# Patient Record
Sex: FEMALE | Race: WHITE | NOT HISPANIC OR LATINO
[De-identification: names, ages, dates, MRNs, and addresses within clinical notes are randomized per-mention and may not be internally consistent; named-entity substitution may affect disease eponyms.]

---

## 2019-09-09 ENCOUNTER — APPOINTMENT (OUTPATIENT)
Dept: OBGYN | Facility: CLINIC | Age: 24
End: 2019-09-09
Payer: COMMERCIAL

## 2019-09-09 ENCOUNTER — ASOB RESULT (OUTPATIENT)
Age: 24
End: 2019-09-09

## 2019-09-09 VITALS
WEIGHT: 138 LBS | BODY MASS INDEX: 21.66 KG/M2 | HEIGHT: 67 IN | SYSTOLIC BLOOD PRESSURE: 108 MMHG | DIASTOLIC BLOOD PRESSURE: 62 MMHG

## 2019-09-09 DIAGNOSIS — Z86.19 PERSONAL HISTORY OF OTHER INFECTIOUS AND PARASITIC DISEASES: ICD-10-CM

## 2019-09-09 DIAGNOSIS — Z80.3 FAMILY HISTORY OF MALIGNANT NEOPLASM OF BREAST: ICD-10-CM

## 2019-09-09 DIAGNOSIS — Z86.59 PERSONAL HISTORY OF OTHER MENTAL AND BEHAVIORAL DISORDERS: ICD-10-CM

## 2019-09-09 DIAGNOSIS — Z78.9 OTHER SPECIFIED HEALTH STATUS: ICD-10-CM

## 2019-09-09 DIAGNOSIS — Z83.3 FAMILY HISTORY OF DIABETES MELLITUS: ICD-10-CM

## 2019-09-09 PROBLEM — Z00.00 ENCOUNTER FOR PREVENTIVE HEALTH EXAMINATION: Status: ACTIVE | Noted: 2019-09-09

## 2019-09-09 LAB
HCG UR QL: POSITIVE
QUALITY CONTROL: YES

## 2019-09-09 PROCEDURE — 76830 TRANSVAGINAL US NON-OB: CPT

## 2019-09-09 PROCEDURE — 81025 URINE PREGNANCY TEST: CPT

## 2019-09-09 PROCEDURE — 99202 OFFICE O/P NEW SF 15 MIN: CPT | Mod: 25

## 2019-09-09 PROCEDURE — 36415 COLL VENOUS BLD VENIPUNCTURE: CPT

## 2019-09-10 PROBLEM — Z78.9 SOCIAL ALCOHOL USE: Status: ACTIVE | Noted: 2019-09-09

## 2019-09-10 PROBLEM — Z83.3 FAMILY HISTORY OF DIABETES MELLITUS: Status: ACTIVE | Noted: 2019-09-09

## 2019-09-10 PROBLEM — Z86.19 HISTORY OF CHLAMYDIA INFECTION: Status: RESOLVED | Noted: 2019-09-09 | Resolved: 2019-09-10

## 2019-09-10 PROBLEM — Z78.9 DOES NOT USE ILLICIT DRUGS: Status: ACTIVE | Noted: 2019-09-09

## 2019-09-10 PROBLEM — Z80.3 FAMILY HISTORY OF MALIGNANT NEOPLASM OF BREAST: Status: ACTIVE | Noted: 2019-09-09

## 2019-09-10 PROBLEM — Z86.59 HISTORY OF OBSESSIVE COMPULSIVE DISORDER: Status: RESOLVED | Noted: 2019-09-09 | Resolved: 2019-09-10

## 2019-09-10 LAB
BASOPHILS # BLD AUTO: 0.03 K/UL
BASOPHILS NFR BLD AUTO: 0.5 %
EOSINOPHIL # BLD AUTO: 0.06 K/UL
EOSINOPHIL NFR BLD AUTO: 1 %
HCT VFR BLD CALC: 39.9 %
HGB BLD-MCNC: 12.9 G/DL
IMM GRANULOCYTES NFR BLD AUTO: 0.2 %
LYMPHOCYTES # BLD AUTO: 1.51 K/UL
LYMPHOCYTES NFR BLD AUTO: 26 %
MAN DIFF?: NORMAL
MCHC RBC-ENTMCNC: 31.9 PG
MCHC RBC-ENTMCNC: 32.3 GM/DL
MCV RBC AUTO: 98.5 FL
MONOCYTES # BLD AUTO: 0.42 K/UL
MONOCYTES NFR BLD AUTO: 7.2 %
NEUTROPHILS # BLD AUTO: 3.77 K/UL
NEUTROPHILS NFR BLD AUTO: 65.1 %
PLATELET # BLD AUTO: 234 K/UL
RBC # BLD: 4.05 M/UL
RBC # FLD: 11.9 %
WBC # FLD AUTO: 5.8 K/UL

## 2019-09-10 NOTE — HISTORY OF PRESENT ILLNESS
[___ Month(s) Ago] : [unfilled] month(s) ago [Good] : being in good health [Healthy Diet] : a healthy diet [Regular Exercise] : regular exercise [Last Pap ___] : Last cervical pap smear was [unfilled] [Reproductive Age] : is of reproductive age [Pregnancy History] : pregnancy history: [Total Preg ___] : [unfilled] [Living ___] : [unfilled] [Multiple Male Partners] : has multiple male partners [Definite:  ___ (Date)] : the last menstrual period was [unfilled] [Normal Amount/Duration] : was of a normal amount and duration [Regular Cycle Intervals] : periods have been regular [Frequency: Q ___ days] : menstrual periods occur approximately every [unfilled] days [Prior Menses:  ___ Date] : date of prior menstruation was [unfilled] [Menarche Age: ____] : age at menarche was [unfilled] [Sexually Active] : is sexually active [Male ___] : [unfilled] male [Weight Concerns] : no concerns with her weight [Menstrual Problems] : reports normal menses [Contraception] : does not use contraception [Spotting Between  Menses] : no spotting between menses [Monogamous] : is not monogamous

## 2019-09-10 NOTE — CHIEF COMPLAINT
[Initial Visit] : initial GYN visit [FreeTextEntry1] : Positive home pregnancy test and vaginal bleeding

## 2019-09-10 NOTE — PHYSICAL EXAM
[Alert] : alert [Awake] : awake [Soft] : soft [Oriented x3] : oriented to person, place, and time [No Lesions] : no genitalia lesions [Normal] : uterus [Labia Majora] : labia major [Pink Rugae] : pink rugae [Labia Minora] : labia minora [Scant] : there was scant vaginal bleeding [Normal Position] : in a normal position [Uterine Adnexae] : were not tender and not enlarged [Acute Distress] : no acute distress [Distended] : not distended [Tender] : non tender [Depressed Mood] : not depressed [Labia Majora Erythema] : no erythema of the labia majora [Labia Minora Erythema] : no erythema of the labia minora [Dilated] : the cervix was not dilated [Tenderness] : nontender [Motion Tenderness] : there was no cervical motion tenderness [Enlarged ___ wks] : not enlarged [Adnexa Tenderness] : were not tender [Mass ___ cm] : no uterine mass was palpated [FreeTextEntry4] : 5 cc of dark vaginal blood. No products of conception. [Ovarian Mass (___ Cm)] : there were no adnexal masses

## 2019-09-11 ENCOUNTER — APPOINTMENT (OUTPATIENT)
Dept: OBGYN | Facility: CLINIC | Age: 24
End: 2019-09-11

## 2019-09-12 LAB
ABO + RH PNL BLD: NORMAL
BLD GP AB SCN SERPL QL: NORMAL
C TRACH RRNA SPEC QL NAA+PROBE: NOT DETECTED
HAV IGM SER QL: NONREACTIVE
HBV CORE IGM SER QL: NONREACTIVE
HBV SURFACE AG SER QL: NONREACTIVE
HCG SERPL-MCNC: 122 MIU/ML
HCG SERPL-MCNC: 289 MIU/ML
HCV AB SER QL: NONREACTIVE
HCV S/CO RATIO: 0.12 S/CO
HIV1+2 AB SPEC QL IA.RAPID: NONREACTIVE
N GONORRHOEA RRNA SPEC QL NAA+PROBE: NOT DETECTED
PROGEST SERPL-MCNC: 0.4 NG/ML
PROGEST SERPL-MCNC: 0.6 NG/ML
SOURCE AMPLIFICATION: NORMAL
SOURCE AMPLIFICATION: NORMAL
T PALLIDUM AB SER QL IA: NEGATIVE
T VAGINALIS RRNA SPEC QL NAA+PROBE: NOT DETECTED

## 2019-09-13 ENCOUNTER — APPOINTMENT (OUTPATIENT)
Dept: OBGYN | Facility: CLINIC | Age: 24
End: 2019-09-13
Payer: COMMERCIAL

## 2019-09-13 LAB
HSV 1+2 IGG SER IA-IMP: NEGATIVE
HSV 1+2 IGG SER IA-IMP: POSITIVE
HSV1 IGG SER QL: 37.1 INDEX
HSV2 IGG SER QL: 0.12 INDEX

## 2019-09-13 PROCEDURE — 36415 COLL VENOUS BLD VENIPUNCTURE: CPT

## 2019-09-13 PROCEDURE — 99213 OFFICE O/P EST LOW 20 MIN: CPT

## 2019-09-18 LAB — HCG SERPL-MCNC: 69 MIU/ML

## 2019-10-22 ENCOUNTER — APPOINTMENT (OUTPATIENT)
Dept: OBGYN | Facility: CLINIC | Age: 24
End: 2019-10-22
Payer: COMMERCIAL

## 2019-10-22 VITALS
WEIGHT: 130 LBS | SYSTOLIC BLOOD PRESSURE: 98 MMHG | DIASTOLIC BLOOD PRESSURE: 60 MMHG | BODY MASS INDEX: 20.4 KG/M2 | HEIGHT: 67 IN

## 2019-10-22 PROCEDURE — 99213 OFFICE O/P EST LOW 20 MIN: CPT

## 2019-10-22 PROCEDURE — 36415 COLL VENOUS BLD VENIPUNCTURE: CPT

## 2019-10-22 PROCEDURE — 81025 URINE PREGNANCY TEST: CPT

## 2019-10-22 NOTE — HISTORY OF PRESENT ILLNESS
[Menarche Age: ____] : age at menarche was [unfilled] [Definite:  ___ (Date)] : the last menstrual period was [unfilled] [Sexually Active] : is sexually active [Monogamous] : is monogamous [Contraception] : uses contraception [Oral Contraceptives] : uses oral contraceptives [Male ___] : [unfilled] male

## 2019-10-23 LAB
HCG SERPL-MCNC: 6630 MIU/ML
HCG UR QL: POSITIVE
PROGEST SERPL-MCNC: 18.7 NG/ML
QUALITY CONTROL: YES

## 2019-10-24 ENCOUNTER — APPOINTMENT (OUTPATIENT)
Dept: OBGYN | Facility: CLINIC | Age: 24
End: 2019-10-24
Payer: COMMERCIAL

## 2019-10-24 VITALS
SYSTOLIC BLOOD PRESSURE: 110 MMHG | DIASTOLIC BLOOD PRESSURE: 76 MMHG | HEIGHT: 67 IN | BODY MASS INDEX: 20.4 KG/M2 | WEIGHT: 130 LBS

## 2019-10-24 PROCEDURE — 99213 OFFICE O/P EST LOW 20 MIN: CPT

## 2019-10-24 PROCEDURE — 36415 COLL VENOUS BLD VENIPUNCTURE: CPT

## 2019-10-24 NOTE — HISTORY OF PRESENT ILLNESS
[unknown] : the patient is unsure of the date of her LMP [Menarche Age: ____] : age at menarche was [unfilled]

## 2019-10-27 LAB
HCG SERPL-MCNC: ABNORMAL MIU/ML
PROGEST SERPL-MCNC: 21.1 NG/ML

## 2019-10-27 NOTE — DISCUSSION/SUMMARY
[FreeTextEntry1] : Pt aware pending lab results, further tx will be decided.  All the pt's questions and concerns were addressed.

## 2019-10-27 NOTE — DISCUSSION/SUMMARY
[FreeTextEntry1] : Pt and boyfriend's questions and concerns were addressed.  Call parameters reviewed.  Pt to RTO 1 week TVS/ appointment.

## 2019-10-31 ENCOUNTER — APPOINTMENT (OUTPATIENT)
Dept: OBGYN | Facility: CLINIC | Age: 24
End: 2019-10-31
Payer: COMMERCIAL

## 2019-10-31 ENCOUNTER — ASOB RESULT (OUTPATIENT)
Age: 24
End: 2019-10-31

## 2019-10-31 VITALS
BODY MASS INDEX: 20.4 KG/M2 | SYSTOLIC BLOOD PRESSURE: 120 MMHG | HEIGHT: 67 IN | WEIGHT: 130 LBS | DIASTOLIC BLOOD PRESSURE: 70 MMHG

## 2019-10-31 PROCEDURE — 81003 URINALYSIS AUTO W/O SCOPE: CPT | Mod: QW

## 2019-10-31 PROCEDURE — 99213 OFFICE O/P EST LOW 20 MIN: CPT | Mod: 25

## 2019-10-31 PROCEDURE — 76817 TRANSVAGINAL US OBSTETRIC: CPT

## 2019-10-31 NOTE — DISCUSSION/SUMMARY
[FreeTextEntry1] : Pt to RTO 1st OB/sono.  Call parameters reviewed.  All the pt's questions and concerns were addressed.

## 2019-11-02 LAB
BILIRUB UR QL STRIP: NORMAL
GLUCOSE UR-MCNC: NORMAL
HCG UR QL: 0.2 EU/DL
HGB UR QL STRIP.AUTO: NORMAL
KETONES UR-MCNC: NORMAL
LEUKOCYTE ESTERASE UR QL STRIP: NORMAL
NITRITE UR QL STRIP: NORMAL
PH UR STRIP: 7
PROT UR STRIP-MCNC: NORMAL
SP GR UR STRIP: 1.01

## 2019-11-21 ENCOUNTER — APPOINTMENT (OUTPATIENT)
Dept: OBGYN | Facility: CLINIC | Age: 24
End: 2019-11-21
Payer: COMMERCIAL

## 2019-11-21 ENCOUNTER — ASOB RESULT (OUTPATIENT)
Age: 24
End: 2019-11-21

## 2019-11-21 VITALS
WEIGHT: 136 LBS | SYSTOLIC BLOOD PRESSURE: 110 MMHG | HEIGHT: 67 IN | BODY MASS INDEX: 21.35 KG/M2 | DIASTOLIC BLOOD PRESSURE: 70 MMHG

## 2019-11-21 PROCEDURE — 0500F INITIAL PRENATAL CARE VISIT: CPT

## 2019-11-21 PROCEDURE — 36415 COLL VENOUS BLD VENIPUNCTURE: CPT

## 2019-11-21 PROCEDURE — 76817 TRANSVAGINAL US OBSTETRIC: CPT

## 2019-11-26 LAB
ABO + RH PNL BLD: NORMAL
B19V IGG SER QL IA: 6.3 INDEX
B19V IGG+IGM SER-IMP: NORMAL
B19V IGG+IGM SER-IMP: POSITIVE
B19V IGM FLD-ACNC: 0.1 INDEX
B19V IGM SER-ACNC: NEGATIVE
BASOPHILS # BLD AUTO: 0.03 K/UL
BASOPHILS NFR BLD AUTO: 0.4 %
BILIRUB UR QL STRIP: NORMAL
BLD GP AB SCN SERPL QL: NORMAL
C TRACH RRNA SPEC QL NAA+PROBE: NOT DETECTED
CMV IGG SERPL QL: <0.2 U/ML
CMV IGG SERPL-IMP: NEGATIVE
CMV IGM SERPL QL: <8 AU/ML
CMV IGM SERPL QL: NEGATIVE
EOSINOPHIL # BLD AUTO: 0.01 K/UL
EOSINOPHIL NFR BLD AUTO: 0.1 %
ESTIMATED AVERAGE GLUCOSE: 94 MG/DL
GLUCOSE UR-MCNC: NORMAL
HBA1C MFR BLD HPLC: 4.9 %
HBV SURFACE AG SER QL: NONREACTIVE
HCG UR QL: 0.2 EU/DL
HCT VFR BLD CALC: 39.4 %
HGB A MFR BLD: 97.6 %
HGB A2 MFR BLD: 2.4 %
HGB BLD-MCNC: 12.9 G/DL
HGB FRACT BLD-IMP: NORMAL
HGB UR QL STRIP.AUTO: NORMAL
HIV1+2 AB SPEC QL IA.RAPID: NONREACTIVE
HPV HIGH+LOW RISK DNA PNL CVX: NOT DETECTED
IMM GRANULOCYTES NFR BLD AUTO: 0.4 %
KETONES UR-MCNC: NORMAL
LEUKOCYTE ESTERASE UR QL STRIP: NORMAL
LYMPHOCYTES # BLD AUTO: 1.68 K/UL
LYMPHOCYTES NFR BLD AUTO: 22 %
M TB IFN-G BLD-IMP: NEGATIVE
MAN DIFF?: NORMAL
MCHC RBC-ENTMCNC: 31.2 PG
MCHC RBC-ENTMCNC: 32.7 GM/DL
MCV RBC AUTO: 95.2 FL
MEV IGG FLD QL IA: 7.8 AU/ML
MEV IGG+IGM SER-IMP: NEGATIVE
MEV IGM SER QL: NEGATIVE
MONOCYTES # BLD AUTO: 0.42 K/UL
MONOCYTES NFR BLD AUTO: 5.5 %
N GONORRHOEA RRNA SPEC QL NAA+PROBE: NOT DETECTED
NEUTROPHILS # BLD AUTO: 5.45 K/UL
NEUTROPHILS NFR BLD AUTO: 71.6 %
NITRITE UR QL STRIP: NORMAL
PH UR STRIP: 6.5
PLATELET # BLD AUTO: 222 K/UL
PROT UR STRIP-MCNC: NORMAL
QUANTIFERON TB PLUS MITOGEN MINUS NIL: 8.49 IU/ML
QUANTIFERON TB PLUS NIL: 0.01 IU/ML
QUANTIFERON TB PLUS TB1 MINUS NIL: 0 IU/ML
QUANTIFERON TB PLUS TB2 MINUS NIL: 0 IU/ML
RBC # BLD: 4.14 M/UL
RBC # FLD: 11.8 %
RUBV IGG FLD-ACNC: 1.8 INDEX
RUBV IGG SER-IMP: POSITIVE
SOURCE TP AMPLIFICATION: NORMAL
SP GR UR STRIP: 1.01
T GONDII AB SER-IMP: ABNORMAL
T GONDII AB SER-IMP: NEGATIVE
T GONDII IGG SER QL: 8.2 IU/ML
T GONDII IGM SER QL: <3 AU/ML
T PALLIDUM AB SER QL IA: NEGATIVE
TSH SERPL-ACNC: 2.03 UIU/ML
VZV AB TITR SER: POSITIVE
VZV IGG SER IF-ACNC: 1459 INDEX
WBC # FLD AUTO: 7.62 K/UL

## 2019-12-05 LAB
AR GENE MUT ANL BLD/T: NEGATIVE
CFTR MUT TESTED BLD/T: NEGATIVE
FMR1 GENE MUT ANL BLD/T: NORMAL

## 2019-12-06 LAB — CYTOLOGY CVX/VAG DOC THIN PREP: ABNORMAL

## 2019-12-09 ENCOUNTER — APPOINTMENT (OUTPATIENT)
Dept: OBGYN | Facility: CLINIC | Age: 24
End: 2019-12-09
Payer: COMMERCIAL

## 2019-12-09 ENCOUNTER — NON-APPOINTMENT (OUTPATIENT)
Age: 24
End: 2019-12-09

## 2019-12-09 ENCOUNTER — ASOB RESULT (OUTPATIENT)
Age: 24
End: 2019-12-09

## 2019-12-09 VITALS
DIASTOLIC BLOOD PRESSURE: 60 MMHG | WEIGHT: 142 LBS | SYSTOLIC BLOOD PRESSURE: 100 MMHG | BODY MASS INDEX: 22.29 KG/M2 | HEIGHT: 67 IN

## 2019-12-09 DIAGNOSIS — Z3A.09 9 WEEKS GESTATION OF PREGNANCY: ICD-10-CM

## 2019-12-09 DIAGNOSIS — Z32.01 ENCOUNTER FOR PREGNANCY TEST, RESULT POSITIVE: ICD-10-CM

## 2019-12-09 DIAGNOSIS — Z11.3 ENCOUNTER FOR SCREENING FOR INFECTIONS WITH A PREDOMINANTLY SEXUAL MODE OF TRANSMISSION: ICD-10-CM

## 2019-12-09 DIAGNOSIS — B58.9 TOXOPLASMOSIS, UNSPECIFIED: ICD-10-CM

## 2019-12-09 LAB
BILIRUB UR QL STRIP: NORMAL
COLLECTION METHOD: NORMAL
GLUCOSE UR-MCNC: NORMAL
HCG UR QL: 0.2 EU/DL
HGB UR QL STRIP.AUTO: NORMAL
KETONES UR-MCNC: NORMAL
LEUKOCYTE ESTERASE UR QL STRIP: NORMAL
NITRITE UR QL STRIP: NORMAL
PH UR STRIP: 7
PROT UR STRIP-MCNC: NORMAL
SP GR UR STRIP: 1

## 2019-12-09 PROCEDURE — 76813 OB US NUCHAL MEAS 1 GEST: CPT

## 2019-12-09 PROCEDURE — 0502F SUBSEQUENT PRENATAL CARE: CPT

## 2019-12-09 PROCEDURE — 36415 COLL VENOUS BLD VENIPUNCTURE: CPT

## 2019-12-11 LAB
T GONDII AB SER-IMP: NEGATIVE
T GONDII AB SER-IMP: POSITIVE
T GONDII IGG SER QL: 8.8 IU/ML
T GONDII IGM SER QL: <3 AU/ML

## 2019-12-17 LAB
1ST TRIMESTER DATA: NORMAL
ADDENDUM DOC: NORMAL
AFP PNL SERPL: NORMAL
AFP SERPL-ACNC: NORMAL
CLINICAL BIOCHEMIST REVIEW: NORMAL
FREE BETA HCG 1ST TRIMESTER: NORMAL
Lab: NORMAL
NASAL BONE: PRESENT
NOTES NTD: NORMAL
NT: NORMAL
PAPP-A SERPL-ACNC: NORMAL
TRISOMY 18/3: NORMAL

## 2019-12-18 ENCOUNTER — APPOINTMENT (OUTPATIENT)
Dept: OBGYN | Facility: CLINIC | Age: 24
End: 2019-12-18
Payer: COMMERCIAL

## 2019-12-18 ENCOUNTER — LABORATORY RESULT (OUTPATIENT)
Age: 24
End: 2019-12-18

## 2019-12-18 VITALS
WEIGHT: 142 LBS | HEIGHT: 67 IN | BODY MASS INDEX: 22.29 KG/M2 | SYSTOLIC BLOOD PRESSURE: 100 MMHG | DIASTOLIC BLOOD PRESSURE: 62 MMHG

## 2019-12-18 DIAGNOSIS — O20.8 OTHER HEMORRHAGE IN EARLY PREGNANCY: ICD-10-CM

## 2019-12-18 DIAGNOSIS — Z87.59 PERSONAL HISTORY OF OTHER COMPLICATIONS OF PREGNANCY, CHILDBIRTH AND THE PUERPERIUM: ICD-10-CM

## 2019-12-18 DIAGNOSIS — O20.0 THREATENED ABORTION: ICD-10-CM

## 2019-12-18 DIAGNOSIS — Z34.91 ENCOUNTER FOR SUPERVISION OF NORMAL PREGNANCY, UNSPECIFIED, FIRST TRIMESTER: ICD-10-CM

## 2019-12-18 LAB
BILIRUB UR QL STRIP: NORMAL
COLLECTION METHOD: NORMAL
GLUCOSE UR-MCNC: NORMAL
HCG UR QL: 0.2 EU/DL
HGB UR QL STRIP.AUTO: NORMAL
KETONES UR-MCNC: NORMAL
LEUKOCYTE ESTERASE UR QL STRIP: NORMAL
NITRITE UR QL STRIP: NORMAL
PH UR STRIP: 6.5
PROT UR STRIP-MCNC: NORMAL
SP GR UR STRIP: 1.01

## 2019-12-18 PROCEDURE — 57455 BIOPSY OF CERVIX W/SCOPE: CPT

## 2019-12-18 NOTE — PROCEDURE
[LGSIL] : low grade squamous intraepithelial lesion [Colposcopy] : colposcopy [Benefits] : benefits [Patient] : patient [Risks] : risks [Alternatives] : alternatives [Bleeding] : bleeding [Infection] : infection [Naga's] : Naga's solution [Consent Obtained] : written consent was obtained prior to the procedure [Allergic Reaction] : allergic reaction [No Complications] : there were no complications [Tolerated Well] : the patient tolerated the procedure well [SCJ Fully Visulized] : the squamocolumnar junction was fully visualized [No Abnormalities] : no abnormalities seen [Pap Performed] : a cervical Pap smear was not performed

## 2019-12-18 NOTE — DISCUSSION/SUMMARY
[FreeTextEntry1] : 24 year old  2 para 0010, who is 13 weeks 3 days pregnant, is here for further evaluation of a her Pap smear which showed low grade squamous intraepithelial lesion.  \par \par The natural course of the human papilloma virus and the progressive development of cervical dysplasia and cancer were discussed at length with the patient.  Details about the colposcopic procedure were reviewed.\par \par A fetal heart check was done prior to and after the procedure and was 150's, 140's, respectively.\par \par All of her concerns were addressed, questions answered and reassurance was given.\par

## 2019-12-18 NOTE — PHYSICAL EXAM
[Awake] : awake [Alert] : alert [Oriented x3] : oriented to person, place, and time [Labia Majora] : labia major [Labia Minora] : labia minora [Normal] : uterus [Acute Distress] : no acute distress [Enlarged ___ wks] : enlarged [unfilled] ~Uweeks

## 2020-01-09 ENCOUNTER — APPOINTMENT (OUTPATIENT)
Dept: OBGYN | Facility: CLINIC | Age: 25
End: 2020-01-09
Payer: COMMERCIAL

## 2020-01-09 ENCOUNTER — NON-APPOINTMENT (OUTPATIENT)
Age: 25
End: 2020-01-09

## 2020-01-09 VITALS
WEIGHT: 141 LBS | BODY MASS INDEX: 22.13 KG/M2 | HEIGHT: 67 IN | DIASTOLIC BLOOD PRESSURE: 62 MMHG | SYSTOLIC BLOOD PRESSURE: 100 MMHG

## 2020-01-09 LAB
BILIRUB UR QL STRIP: NORMAL
COLLECTION METHOD: NORMAL
GLUCOSE UR-MCNC: NORMAL
HCG UR QL: 0.2 EU/DL
HGB UR QL STRIP.AUTO: NORMAL
KETONES UR-MCNC: NORMAL
LEUKOCYTE ESTERASE UR QL STRIP: NORMAL
NITRITE UR QL STRIP: NORMAL
PH UR STRIP: 6
PROT UR STRIP-MCNC: NORMAL
SP GR UR STRIP: 1.1

## 2020-01-09 PROCEDURE — 0502F SUBSEQUENT PRENATAL CARE: CPT

## 2020-01-09 PROCEDURE — 36415 COLL VENOUS BLD VENIPUNCTURE: CPT

## 2020-01-21 LAB
1ST TRIMESTER DATA: NORMAL
2ND TRIMESTER DATA: NORMAL
AFP PNL SERPL: NORMAL
AFP SERPL-ACNC: NORMAL
AFP SERPL-ACNC: NORMAL
B-HCG FREE SERPL-MCNC: NORMAL
CLINICAL BIOCHEMIST REVIEW: NORMAL
FREE BETA HCG 1ST TRIMESTER: NORMAL
INHIBIN A SERPL-MCNC: NORMAL
NASAL BONE: PRESENT
NOTES NTD: NORMAL
NT: NORMAL
PAPP-A SERPL-ACNC: NORMAL
U ESTRIOL SERPL-SCNC: NORMAL

## 2020-02-03 ENCOUNTER — APPOINTMENT (OUTPATIENT)
Dept: ANTEPARTUM | Facility: CLINIC | Age: 25
End: 2020-02-03
Payer: COMMERCIAL

## 2020-02-03 ENCOUNTER — ASOB RESULT (OUTPATIENT)
Age: 25
End: 2020-02-03

## 2020-02-03 PROCEDURE — 76811 OB US DETAILED SNGL FETUS: CPT

## 2020-02-03 PROCEDURE — 76817 TRANSVAGINAL US OBSTETRIC: CPT

## 2020-02-10 ENCOUNTER — APPOINTMENT (OUTPATIENT)
Dept: OBGYN | Facility: CLINIC | Age: 25
End: 2020-02-10
Payer: COMMERCIAL

## 2020-02-10 ENCOUNTER — NON-APPOINTMENT (OUTPATIENT)
Age: 25
End: 2020-02-10

## 2020-02-10 VITALS
WEIGHT: 149 LBS | BODY MASS INDEX: 23.39 KG/M2 | DIASTOLIC BLOOD PRESSURE: 70 MMHG | HEIGHT: 67 IN | SYSTOLIC BLOOD PRESSURE: 118 MMHG

## 2020-02-10 PROCEDURE — 36415 COLL VENOUS BLD VENIPUNCTURE: CPT

## 2020-02-10 PROCEDURE — 0502F SUBSEQUENT PRENATAL CARE: CPT

## 2020-02-18 ENCOUNTER — LABORATORY RESULT (OUTPATIENT)
Age: 25
End: 2020-02-18

## 2020-02-19 ENCOUNTER — APPOINTMENT (OUTPATIENT)
Dept: ANTEPARTUM | Facility: CLINIC | Age: 25
End: 2020-02-19

## 2020-02-19 ENCOUNTER — APPOINTMENT (OUTPATIENT)
Dept: MATERNAL FETAL MEDICINE | Facility: CLINIC | Age: 25
End: 2020-02-19
Payer: COMMERCIAL

## 2020-02-19 ENCOUNTER — LABORATORY RESULT (OUTPATIENT)
Age: 25
End: 2020-02-19

## 2020-02-19 ENCOUNTER — ASOB RESULT (OUTPATIENT)
Age: 25
End: 2020-02-19

## 2020-02-19 PROCEDURE — 99215 OFFICE O/P EST HI 40 MIN: CPT

## 2020-02-20 ENCOUNTER — TRANSCRIPTION ENCOUNTER (OUTPATIENT)
Age: 25
End: 2020-02-20

## 2020-02-27 ENCOUNTER — NON-APPOINTMENT (OUTPATIENT)
Age: 25
End: 2020-02-27

## 2020-02-28 LAB
BILIRUB UR QL STRIP: NORMAL
GLUCOSE UR-MCNC: NORMAL
HCG UR QL: 0.2 EU/DL
HGB UR QL STRIP.AUTO: NORMAL
KETONES UR-MCNC: NORMAL
LEUKOCYTE ESTERASE UR QL STRIP: NORMAL
NITRITE UR QL STRIP: NORMAL
PH UR STRIP: 7.5
PROT UR STRIP-MCNC: NORMAL
SP GR UR STRIP: 1.01

## 2020-03-09 ENCOUNTER — APPOINTMENT (OUTPATIENT)
Dept: OBGYN | Facility: CLINIC | Age: 25
End: 2020-03-09
Payer: COMMERCIAL

## 2020-03-09 ENCOUNTER — NON-APPOINTMENT (OUTPATIENT)
Age: 25
End: 2020-03-09

## 2020-03-09 VITALS — DIASTOLIC BLOOD PRESSURE: 64 MMHG | BODY MASS INDEX: 23.81 KG/M2 | SYSTOLIC BLOOD PRESSURE: 102 MMHG | WEIGHT: 152 LBS

## 2020-03-09 DIAGNOSIS — Z3A.12 12 WEEKS GESTATION OF PREGNANCY: ICD-10-CM

## 2020-03-09 DIAGNOSIS — Z3A.13 13 WEEKS GESTATION OF PREGNANCY: ICD-10-CM

## 2020-03-09 DIAGNOSIS — Z3A.16 16 WEEKS GESTATION OF PREGNANCY: ICD-10-CM

## 2020-03-09 DIAGNOSIS — Z3A.25 25 WEEKS GESTATION OF PREGNANCY: ICD-10-CM

## 2020-03-09 LAB
BILIRUB UR QL STRIP: NORMAL
GLUCOSE UR-MCNC: NORMAL
HCG UR QL: 0.2 EU/DL
HGB UR QL STRIP.AUTO: NORMAL
KETONES UR-MCNC: NORMAL
LEUKOCYTE ESTERASE UR QL STRIP: NORMAL
NITRITE UR QL STRIP: NORMAL
PH UR STRIP: 6.5
PROT UR STRIP-MCNC: NORMAL
SP GR UR STRIP: 1.02

## 2020-03-09 PROCEDURE — 36415 COLL VENOUS BLD VENIPUNCTURE: CPT

## 2020-03-09 PROCEDURE — 0502F SUBSEQUENT PRENATAL CARE: CPT

## 2020-03-12 LAB
BASOPHILS # BLD AUTO: 0.03 K/UL
BASOPHILS NFR BLD AUTO: 0.3 %
EOSINOPHIL # BLD AUTO: 0.03 K/UL
EOSINOPHIL NFR BLD AUTO: 0.3 %
GLUCOSE 1H P 50 G GLC PO SERPL-MCNC: 145 MG/DL
HCT VFR BLD CALC: 35.9 %
HGB BLD-MCNC: 11.9 G/DL
IMM GRANULOCYTES NFR BLD AUTO: 0.2 %
LYMPHOCYTES # BLD AUTO: 1.58 K/UL
LYMPHOCYTES NFR BLD AUTO: 17 %
MAN DIFF?: NORMAL
MCHC RBC-ENTMCNC: 32.4 PG
MCHC RBC-ENTMCNC: 33.1 GM/DL
MCV RBC AUTO: 97.8 FL
MONOCYTES # BLD AUTO: 0.53 K/UL
MONOCYTES NFR BLD AUTO: 5.7 %
NEUTROPHILS # BLD AUTO: 7.12 K/UL
NEUTROPHILS NFR BLD AUTO: 76.5 %
PLATELET # BLD AUTO: 165 K/UL
RBC # BLD: 3.67 M/UL
RBC # FLD: 12.1 %
WBC # FLD AUTO: 9.31 K/UL

## 2020-03-13 LAB
GLUCOSE 1H P 100 G GLC PO SERPL-MCNC: 139 MG/DL
GLUCOSE 2H P CHAL SERPL-MCNC: 135 MG/DL
GLUCOSE 3H P CHAL SERPL-MCNC: 110 MG/DL
GLUCOSE BS SERPL-MCNC: 71 MG/DL

## 2020-03-30 ENCOUNTER — NON-APPOINTMENT (OUTPATIENT)
Age: 25
End: 2020-03-30

## 2020-04-03 ENCOUNTER — NON-APPOINTMENT (OUTPATIENT)
Age: 25
End: 2020-04-03

## 2020-04-03 ENCOUNTER — APPOINTMENT (OUTPATIENT)
Dept: OBGYN | Facility: CLINIC | Age: 25
End: 2020-04-03
Payer: COMMERCIAL

## 2020-04-03 VITALS
DIASTOLIC BLOOD PRESSURE: 68 MMHG | BODY MASS INDEX: 24.64 KG/M2 | HEIGHT: 67 IN | SYSTOLIC BLOOD PRESSURE: 120 MMHG | WEIGHT: 157 LBS

## 2020-04-03 LAB
BILIRUB UR QL STRIP: NORMAL
BILIRUB UR QL STRIP: NORMAL
CLARITY UR: CLEAR
COLLECTION METHOD: NORMAL
GLUCOSE UR-MCNC: NORMAL
GLUCOSE UR-MCNC: NORMAL
HCG UR QL: 0.2 EU/DL
HGB UR QL STRIP.AUTO: NORMAL
HGB UR QL STRIP.AUTO: NORMAL
KETONES UR-MCNC: NORMAL
KETONES UR-MCNC: NORMAL
LEUKOCYTE ESTERASE UR QL STRIP: NORMAL
LEUKOCYTE ESTERASE UR QL STRIP: NORMAL
NITRITE UR QL STRIP: NORMAL
NITRITE UR QL STRIP: NORMAL
PH UR STRIP: 6.5
PROT UR STRIP-MCNC: NORMAL
PROT UR STRIP-MCNC: NORMAL
SP GR UR STRIP: 1.02

## 2020-04-03 PROCEDURE — 0502F SUBSEQUENT PRENATAL CARE: CPT

## 2020-04-30 ENCOUNTER — APPOINTMENT (OUTPATIENT)
Dept: OBGYN | Facility: CLINIC | Age: 25
End: 2020-04-30
Payer: COMMERCIAL

## 2020-04-30 ENCOUNTER — NON-APPOINTMENT (OUTPATIENT)
Age: 25
End: 2020-04-30

## 2020-04-30 ENCOUNTER — ASOB RESULT (OUTPATIENT)
Age: 25
End: 2020-04-30

## 2020-04-30 VITALS
BODY MASS INDEX: 26.06 KG/M2 | HEIGHT: 67 IN | WEIGHT: 166 LBS | DIASTOLIC BLOOD PRESSURE: 60 MMHG | SYSTOLIC BLOOD PRESSURE: 90 MMHG

## 2020-04-30 DIAGNOSIS — Z34.92 ENCOUNTER FOR SUPERVISION OF NORMAL PREGNANCY, UNSPECIFIED, SECOND TRIMESTER: ICD-10-CM

## 2020-04-30 DIAGNOSIS — Z3A.28 28 WEEKS GESTATION OF PREGNANCY: ICD-10-CM

## 2020-04-30 LAB
BILIRUB UR QL STRIP: NORMAL
GLUCOSE UR-MCNC: NORMAL
HCG UR QL: 0.2 EU/DL
HGB UR QL STRIP.AUTO: NORMAL
KETONES UR-MCNC: NORMAL
LEUKOCYTE ESTERASE UR QL STRIP: NORMAL
NITRITE UR QL STRIP: NORMAL
PH UR STRIP: 6.5
PROT UR STRIP-MCNC: NORMAL
SP GR UR STRIP: 1.01

## 2020-04-30 PROCEDURE — 0502F SUBSEQUENT PRENATAL CARE: CPT

## 2020-04-30 PROCEDURE — 90471 IMMUNIZATION ADMIN: CPT

## 2020-04-30 PROCEDURE — 76816 OB US FOLLOW-UP PER FETUS: CPT

## 2020-04-30 PROCEDURE — 90715 TDAP VACCINE 7 YRS/> IM: CPT

## 2020-05-01 ENCOUNTER — APPOINTMENT (OUTPATIENT)
Dept: ANTEPARTUM | Facility: CLINIC | Age: 25
End: 2020-05-01

## 2020-05-14 ENCOUNTER — ASOB RESULT (OUTPATIENT)
Age: 25
End: 2020-05-14

## 2020-05-14 ENCOUNTER — APPOINTMENT (OUTPATIENT)
Dept: ANTEPARTUM | Facility: CLINIC | Age: 25
End: 2020-05-14
Payer: COMMERCIAL

## 2020-05-14 PROCEDURE — 76816 OB US FOLLOW-UP PER FETUS: CPT

## 2020-05-14 PROCEDURE — 76820 UMBILICAL ARTERY ECHO: CPT

## 2020-05-14 PROCEDURE — 76821 MIDDLE CEREBRAL ARTERY ECHO: CPT

## 2020-05-14 PROCEDURE — 76819 FETAL BIOPHYS PROFIL W/O NST: CPT

## 2020-05-14 PROCEDURE — 93976 VASCULAR STUDY: CPT

## 2020-05-21 ENCOUNTER — APPOINTMENT (OUTPATIENT)
Dept: ANTEPARTUM | Facility: CLINIC | Age: 25
End: 2020-05-21
Payer: COMMERCIAL

## 2020-05-21 ENCOUNTER — ASOB RESULT (OUTPATIENT)
Age: 25
End: 2020-05-21

## 2020-05-21 VITALS — TEMPERATURE: 98.9 F

## 2020-05-21 PROCEDURE — 76821 MIDDLE CEREBRAL ARTERY ECHO: CPT | Mod: 59

## 2020-05-21 PROCEDURE — 76820 UMBILICAL ARTERY ECHO: CPT

## 2020-05-21 PROCEDURE — 93976 VASCULAR STUDY: CPT

## 2020-05-21 PROCEDURE — 76818 FETAL BIOPHYS PROFILE W/NST: CPT

## 2020-05-23 ENCOUNTER — OUTPATIENT (OUTPATIENT)
Dept: INPATIENT UNIT | Facility: HOSPITAL | Age: 25
LOS: 1 days | End: 2020-05-23
Payer: COMMERCIAL

## 2020-05-23 VITALS — HEART RATE: 99 BPM | SYSTOLIC BLOOD PRESSURE: 122 MMHG | DIASTOLIC BLOOD PRESSURE: 82 MMHG

## 2020-05-23 VITALS — SYSTOLIC BLOOD PRESSURE: 130 MMHG | DIASTOLIC BLOOD PRESSURE: 81 MMHG | HEART RATE: 111 BPM

## 2020-05-23 DIAGNOSIS — O47.03 FALSE LABOR BEFORE 37 COMPLETED WEEKS OF GESTATION, THIRD TRIMESTER: ICD-10-CM

## 2020-05-23 PROCEDURE — 59025 FETAL NON-STRESS TEST: CPT

## 2020-05-23 PROCEDURE — G0463: CPT

## 2020-05-23 NOTE — OB PROVIDER TRIAGE NOTE - HISTORY OF PRESENT ILLNESS
23 yo  here at 35.6 wks GA (dated by 1st tri sono) for decreased FM. She reports feeling less movement for the last 24 hrs. This pregnancy has been complicated by FGR (4th%ile) on May 14. She also had a low AFT (7 cm) on last US done on 20. She has been getting twice weekly testing at the [facility] office.     Obhx: SAB x 1  PMH: anxiety, migraines, CIN1  PSH: none

## 2020-05-23 NOTE — OB PROVIDER TRIAGE NOTE - NSHPPHYSICALEXAM_GEN_ALL_CORE
ICU Vital Signs Last 24 Hrs  T(C): 36.4 (23 May 2020 15:16), Max: 36.4 (23 May 2020 15:16)  T(F): 97.5 (23 May 2020 15:16), Max: 97.5 (23 May 2020 15:16)  HR: 99 (23 May 2020 15:40) (99 - 111)  BP: 122/82 (23 May 2020 15:40) (122/82 - 130/81)  RR: 16 (23 May 2020 15:16) (16 - 16)      FHT:140/mod kassandra/+accels   [place]: none

## 2020-05-23 NOTE — OB PROVIDER TRIAGE NOTE - NSOBPROVIDERNOTE_OBGYN_ALL_OB_FT
25 yo  here at 35.6 wks GA for decreased fetal movement. Reactive NST. Continue twice weekly monitoring with MFM.       discussed with Dr. Vincent

## 2020-05-28 ENCOUNTER — APPOINTMENT (OUTPATIENT)
Dept: ANTEPARTUM | Facility: CLINIC | Age: 25
End: 2020-05-28
Payer: COMMERCIAL

## 2020-05-28 ENCOUNTER — ASOB RESULT (OUTPATIENT)
Age: 25
End: 2020-05-28

## 2020-05-28 PROCEDURE — 76818 FETAL BIOPHYS PROFILE W/NST: CPT

## 2020-05-28 PROCEDURE — 76820 UMBILICAL ARTERY ECHO: CPT

## 2020-05-28 PROCEDURE — 76816 OB US FOLLOW-UP PER FETUS: CPT

## 2020-05-28 PROCEDURE — 76821 MIDDLE CEREBRAL ARTERY ECHO: CPT

## 2020-05-28 PROCEDURE — 93976 VASCULAR STUDY: CPT

## 2020-05-29 ENCOUNTER — APPOINTMENT (OUTPATIENT)
Dept: OBGYN | Facility: CLINIC | Age: 25
End: 2020-05-29
Payer: COMMERCIAL

## 2020-05-29 ENCOUNTER — NON-APPOINTMENT (OUTPATIENT)
Age: 25
End: 2020-05-29

## 2020-05-29 VITALS
SYSTOLIC BLOOD PRESSURE: 120 MMHG | BODY MASS INDEX: 27.78 KG/M2 | HEIGHT: 67 IN | DIASTOLIC BLOOD PRESSURE: 68 MMHG | WEIGHT: 177 LBS

## 2020-05-29 LAB
BILIRUB UR QL STRIP: NORMAL
CLARITY UR: CLEAR
COLLECTION METHOD: NORMAL
GLUCOSE UR-MCNC: NORMAL
HCG UR QL: 0.2 EU/DL
HGB UR QL STRIP.AUTO: NORMAL
KETONES UR-MCNC: NORMAL
LEUKOCYTE ESTERASE UR QL STRIP: NORMAL
NITRITE UR QL STRIP: NORMAL
PH UR STRIP: 6.5
PROT UR STRIP-MCNC: NORMAL
SP GR UR STRIP: 1.02

## 2020-05-29 PROCEDURE — 0502F SUBSEQUENT PRENATAL CARE: CPT

## 2020-05-29 PROCEDURE — 36415 COLL VENOUS BLD VENIPUNCTURE: CPT

## 2020-05-30 LAB — HIV1+2 AB SPEC QL IA.RAPID: NONREACTIVE

## 2020-06-01 ENCOUNTER — APPOINTMENT (OUTPATIENT)
Dept: ANTEPARTUM | Facility: CLINIC | Age: 25
End: 2020-06-01
Payer: COMMERCIAL

## 2020-06-01 ENCOUNTER — ASOB RESULT (OUTPATIENT)
Age: 25
End: 2020-06-01

## 2020-06-01 LAB — B-HEM STREP SPEC QL CULT: NORMAL

## 2020-06-01 PROCEDURE — 76820 UMBILICAL ARTERY ECHO: CPT

## 2020-06-01 PROCEDURE — 93976 VASCULAR STUDY: CPT

## 2020-06-01 PROCEDURE — 76818 FETAL BIOPHYS PROFILE W/NST: CPT

## 2020-06-02 ENCOUNTER — NON-APPOINTMENT (OUTPATIENT)
Age: 25
End: 2020-06-02

## 2020-06-04 ENCOUNTER — APPOINTMENT (OUTPATIENT)
Dept: ANTEPARTUM | Facility: CLINIC | Age: 25
End: 2020-06-04
Payer: COMMERCIAL

## 2020-06-04 ENCOUNTER — ASOB RESULT (OUTPATIENT)
Age: 25
End: 2020-06-04

## 2020-06-04 PROCEDURE — 76818 FETAL BIOPHYS PROFILE W/NST: CPT

## 2020-06-05 ENCOUNTER — APPOINTMENT (OUTPATIENT)
Dept: OBGYN | Facility: CLINIC | Age: 25
End: 2020-06-05
Payer: COMMERCIAL

## 2020-06-05 ENCOUNTER — NON-APPOINTMENT (OUTPATIENT)
Age: 25
End: 2020-06-05

## 2020-06-05 VITALS
WEIGHT: 176 LBS | HEIGHT: 67 IN | SYSTOLIC BLOOD PRESSURE: 120 MMHG | BODY MASS INDEX: 27.62 KG/M2 | DIASTOLIC BLOOD PRESSURE: 78 MMHG

## 2020-06-05 PROCEDURE — 0502F SUBSEQUENT PRENATAL CARE: CPT

## 2020-06-08 ENCOUNTER — ASOB RESULT (OUTPATIENT)
Age: 25
End: 2020-06-08

## 2020-06-08 ENCOUNTER — APPOINTMENT (OUTPATIENT)
Dept: ANTEPARTUM | Facility: CLINIC | Age: 25
End: 2020-06-08
Payer: COMMERCIAL

## 2020-06-08 ENCOUNTER — APPOINTMENT (OUTPATIENT)
Dept: MATERNAL FETAL MEDICINE | Facility: CLINIC | Age: 25
End: 2020-06-08
Payer: COMMERCIAL

## 2020-06-08 PROCEDURE — 93976 VASCULAR STUDY: CPT

## 2020-06-08 PROCEDURE — 76820 UMBILICAL ARTERY ECHO: CPT

## 2020-06-08 PROCEDURE — 76821 MIDDLE CEREBRAL ARTERY ECHO: CPT

## 2020-06-08 PROCEDURE — 76818 FETAL BIOPHYS PROFILE W/NST: CPT

## 2020-06-08 PROCEDURE — 99214 OFFICE O/P EST MOD 30 MIN: CPT | Mod: 25

## 2020-06-09 ENCOUNTER — OUTPATIENT (OUTPATIENT)
Dept: OUTPATIENT SERVICES | Facility: HOSPITAL | Age: 25
LOS: 1 days | End: 2020-06-09
Payer: COMMERCIAL

## 2020-06-09 DIAGNOSIS — Z11.59 ENCOUNTER FOR SCREENING FOR OTHER VIRAL DISEASES: ICD-10-CM

## 2020-06-09 DIAGNOSIS — Z98.890 OTHER SPECIFIED POSTPROCEDURAL STATES: Chronic | ICD-10-CM

## 2020-06-09 PROCEDURE — U0003: CPT

## 2020-06-09 RX ORDER — SODIUM CHLORIDE 9 MG/ML
1000 INJECTION, SOLUTION INTRAVENOUS
Refills: 0 | Status: DISCONTINUED | OUTPATIENT
Start: 2020-06-10 | End: 2020-06-10

## 2020-06-09 RX ORDER — OXYTOCIN 10 UNIT/ML
333.33 VIAL (ML) INJECTION
Qty: 20 | Refills: 0 | Status: DISCONTINUED | OUTPATIENT
Start: 2020-06-10 | End: 2020-06-12

## 2020-06-09 NOTE — OB PROVIDER H&P - HISTORY OF PRESENT ILLNESS
Patient is a 23 yo  at 59hsv8y c/w 1st trimester sono who presents to L&D for an IOL for FGR          ONEL: 2020     LMP: 2019          Prenatal course complicated by:     Fetal growth restriction (6%-tile) - NL doppler studies     Rubeola Non-immune          OBHx: SAB X 1     PMH: HSV-1, Migraines, Depression, OCD, Abnormal pap     PSH: Lasik, ACL repair     Meds: PNV, Tylenol      ALL: NKDA     Sono/EFW: Cephalic/Posterior placenta/2650g     BMI: 22.24 Patient is a 25 y/o  at 44cig3n c/w 1st trimester sono who presents to L&D for an induction of labor for fetal growth restriction (EFW on : 6%) with normal doppler studies.    ONEL: 2020   LMP: 2019     Prenatal course complicated by:   1) Fetal growth restriction (6% on ) - Normal doppler studies   2) Rubeola Non-immune   3) Echogenic intracardiac focus on L2 sono     PMH: Migraines, Anxiety, Depression, OCD  OBHx: SAB X 1   Gyn Hx: h/o ERASMO 1. HSV-1 on serology.   PSH: Lasik, ACL repair   Meds: PNV, Tylenol    ALL: NKDA     Sono/EFW: Cephalic/Posterior placenta/2650g     BMI: 22.24

## 2020-06-09 NOTE — OB PROVIDER H&P - ASSESSMENT
Patient is a 25 yo  at 89vyz7n c/w 1st trimester sono who presents to L&D for an IOL for FGR (6%-tile)    PLAN:  - Consent  - Admission labs  - GBS NEG  - COVID-19:  - Continuous toco/FHT  - Maternal/fetal status reassuring  - Admit to L&D Patient is a 23 y/o  at 38wks 3d c/w 1st trimester sono, who presents to L&D for an induction of labor for fetal growth restriction (EFW 6%).    PLAN:  - Admit to L&D  - Admission labs  - Consent  - Maternal/fetal status reassuring.  - Start induction with Pitocin.  - GBS Negative  - COVID-19: Negative.  - Continuous toco/FHT.  - Will continue to monitor.

## 2020-06-09 NOTE — OB PROVIDER H&P - ALERT: PERTINENT HISTORY
1st Trimester Sonogram/20 Week Level II Sonogram/Non Invasive Prenatal Screen (NIPS)/Fetal Non-Stress Test (NST)/BioPhysical Profile(s)/Follow up Sonogram for Growth/Ultra Screen at 12 Weeks

## 2020-06-10 ENCOUNTER — RESULT REVIEW (OUTPATIENT)
Age: 25
End: 2020-06-10

## 2020-06-10 ENCOUNTER — APPOINTMENT (OUTPATIENT)
Dept: OBGYN | Facility: HOSPITAL | Age: 25
End: 2020-06-10

## 2020-06-10 ENCOUNTER — TRANSCRIPTION ENCOUNTER (OUTPATIENT)
Age: 25
End: 2020-06-10

## 2020-06-10 ENCOUNTER — INPATIENT (INPATIENT)
Facility: HOSPITAL | Age: 25
LOS: 1 days | Discharge: HOME | End: 2020-06-12
Attending: OBSTETRICS & GYNECOLOGY | Admitting: OBSTETRICS & GYNECOLOGY
Payer: COMMERCIAL

## 2020-06-10 VITALS — HEART RATE: 114 BPM | DIASTOLIC BLOOD PRESSURE: 86 MMHG | SYSTOLIC BLOOD PRESSURE: 131 MMHG

## 2020-06-10 DIAGNOSIS — Z98.890 OTHER SPECIFIED POSTPROCEDURAL STATES: Chronic | ICD-10-CM

## 2020-06-10 LAB
BASOPHILS # BLD AUTO: 0.02 K/UL — SIGNIFICANT CHANGE UP (ref 0–0.2)
BASOPHILS NFR BLD AUTO: 0.3 % — SIGNIFICANT CHANGE UP (ref 0–2)
BLD GP AB SCN SERPL QL: SIGNIFICANT CHANGE UP
EOSINOPHIL # BLD AUTO: 0.03 K/UL — SIGNIFICANT CHANGE UP (ref 0–0.5)
EOSINOPHIL NFR BLD AUTO: 0.4 % — SIGNIFICANT CHANGE UP (ref 0–6)
HCT VFR BLD CALC: 35.4 % — SIGNIFICANT CHANGE UP (ref 34.5–45)
HGB BLD-MCNC: 11.6 G/DL — SIGNIFICANT CHANGE UP (ref 11.5–15.5)
IMM GRANULOCYTES NFR BLD AUTO: 0.4 % — SIGNIFICANT CHANGE UP (ref 0–1.5)
LYMPHOCYTES # BLD AUTO: 1.53 K/UL — SIGNIFICANT CHANGE UP (ref 1–3.3)
LYMPHOCYTES # BLD AUTO: 20.6 % — SIGNIFICANT CHANGE UP (ref 13–44)
MCHC RBC-ENTMCNC: 31.9 PG — SIGNIFICANT CHANGE UP (ref 27–34)
MCHC RBC-ENTMCNC: 32.8 GM/DL — SIGNIFICANT CHANGE UP (ref 32–36)
MCV RBC AUTO: 97.3 FL — SIGNIFICANT CHANGE UP (ref 80–100)
MONOCYTES # BLD AUTO: 0.67 K/UL — SIGNIFICANT CHANGE UP (ref 0–0.9)
MONOCYTES NFR BLD AUTO: 9 % — SIGNIFICANT CHANGE UP (ref 2–14)
NEUTROPHILS # BLD AUTO: 5.16 K/UL — SIGNIFICANT CHANGE UP (ref 1.8–7.4)
NEUTROPHILS NFR BLD AUTO: 69.3 % — SIGNIFICANT CHANGE UP (ref 43–77)
PLATELET # BLD AUTO: 133 K/UL — LOW (ref 150–400)
RBC # BLD: 3.64 M/UL — LOW (ref 3.8–5.2)
RBC # FLD: 13 % — SIGNIFICANT CHANGE UP (ref 10.3–14.5)
SARS-COV-2 RNA SPEC QL NAA+PROBE: SIGNIFICANT CHANGE UP
T PALLIDUM AB TITR SER: NEGATIVE — SIGNIFICANT CHANGE UP
WBC # BLD: 7.44 K/UL — SIGNIFICANT CHANGE UP (ref 3.8–10.5)
WBC # FLD AUTO: 7.44 K/UL — SIGNIFICANT CHANGE UP (ref 3.8–10.5)

## 2020-06-10 PROCEDURE — 88307 TISSUE EXAM BY PATHOLOGIST: CPT | Mod: 26

## 2020-06-10 PROCEDURE — 59400 OBSTETRICAL CARE: CPT

## 2020-06-10 RX ORDER — OXYTOCIN 10 UNIT/ML
333.33 VIAL (ML) INJECTION
Qty: 20 | Refills: 0 | Status: DISCONTINUED | OUTPATIENT
Start: 2020-06-10 | End: 2020-06-12

## 2020-06-10 RX ORDER — TETANUS TOXOID, REDUCED DIPHTHERIA TOXOID AND ACELLULAR PERTUSSIS VACCINE, ADSORBED 5; 2.5; 8; 8; 2.5 [IU]/.5ML; [IU]/.5ML; UG/.5ML; UG/.5ML; UG/.5ML
0.5 SUSPENSION INTRAMUSCULAR ONCE
Refills: 0 | Status: DISCONTINUED | OUTPATIENT
Start: 2020-06-10 | End: 2020-06-12

## 2020-06-10 RX ORDER — MAGNESIUM HYDROXIDE 400 MG/1
30 TABLET, CHEWABLE ORAL
Refills: 0 | Status: DISCONTINUED | OUTPATIENT
Start: 2020-06-10 | End: 2020-06-12

## 2020-06-10 RX ORDER — KETOROLAC TROMETHAMINE 30 MG/ML
30 SYRINGE (ML) INJECTION ONCE
Refills: 0 | Status: DISCONTINUED | OUTPATIENT
Start: 2020-06-10 | End: 2020-06-10

## 2020-06-10 RX ORDER — PRAMOXINE HYDROCHLORIDE 150 MG/15G
1 AEROSOL, FOAM RECTAL EVERY 4 HOURS
Refills: 0 | Status: DISCONTINUED | OUTPATIENT
Start: 2020-06-10 | End: 2020-06-12

## 2020-06-10 RX ORDER — SIMETHICONE 80 MG/1
80 TABLET, CHEWABLE ORAL EVERY 4 HOURS
Refills: 0 | Status: DISCONTINUED | OUTPATIENT
Start: 2020-06-10 | End: 2020-06-12

## 2020-06-10 RX ORDER — OXYCODONE HYDROCHLORIDE 5 MG/1
5 TABLET ORAL
Refills: 0 | Status: DISCONTINUED | OUTPATIENT
Start: 2020-06-10 | End: 2020-06-12

## 2020-06-10 RX ORDER — LANOLIN
1 OINTMENT (GRAM) TOPICAL EVERY 6 HOURS
Refills: 0 | Status: DISCONTINUED | OUTPATIENT
Start: 2020-06-10 | End: 2020-06-12

## 2020-06-10 RX ORDER — BENZOCAINE 10 %
1 GEL (GRAM) MUCOUS MEMBRANE EVERY 6 HOURS
Refills: 0 | Status: DISCONTINUED | OUTPATIENT
Start: 2020-06-10 | End: 2020-06-12

## 2020-06-10 RX ORDER — CITRIC ACID/SODIUM CITRATE 300-500 MG
30 SOLUTION, ORAL ORAL ONCE
Refills: 0 | Status: COMPLETED | OUTPATIENT
Start: 2020-06-10 | End: 2020-06-10

## 2020-06-10 RX ORDER — DIPHENHYDRAMINE HCL 50 MG
25 CAPSULE ORAL EVERY 6 HOURS
Refills: 0 | Status: DISCONTINUED | OUTPATIENT
Start: 2020-06-10 | End: 2020-06-12

## 2020-06-10 RX ORDER — ACETAMINOPHEN 500 MG
975 TABLET ORAL
Refills: 0 | Status: DISCONTINUED | OUTPATIENT
Start: 2020-06-10 | End: 2020-06-12

## 2020-06-10 RX ORDER — HYDROCORTISONE 1 %
1 OINTMENT (GRAM) TOPICAL EVERY 6 HOURS
Refills: 0 | Status: DISCONTINUED | OUTPATIENT
Start: 2020-06-10 | End: 2020-06-12

## 2020-06-10 RX ORDER — OXYTOCIN 10 UNIT/ML
2 VIAL (ML) INJECTION
Qty: 30 | Refills: 0 | Status: DISCONTINUED | OUTPATIENT
Start: 2020-06-10 | End: 2020-06-10

## 2020-06-10 RX ORDER — OXYCODONE HYDROCHLORIDE 5 MG/1
5 TABLET ORAL ONCE
Refills: 0 | Status: DISCONTINUED | OUTPATIENT
Start: 2020-06-10 | End: 2020-06-12

## 2020-06-10 RX ORDER — SODIUM CHLORIDE 9 MG/ML
3 INJECTION INTRAMUSCULAR; INTRAVENOUS; SUBCUTANEOUS EVERY 8 HOURS
Refills: 0 | Status: DISCONTINUED | OUTPATIENT
Start: 2020-06-10 | End: 2020-06-12

## 2020-06-10 RX ORDER — DIBUCAINE 1 %
1 OINTMENT (GRAM) RECTAL EVERY 6 HOURS
Refills: 0 | Status: DISCONTINUED | OUTPATIENT
Start: 2020-06-10 | End: 2020-06-12

## 2020-06-10 RX ORDER — AER TRAVELER 0.5 G/1
1 SOLUTION RECTAL; TOPICAL EVERY 4 HOURS
Refills: 0 | Status: DISCONTINUED | OUTPATIENT
Start: 2020-06-10 | End: 2020-06-12

## 2020-06-10 RX ORDER — IBUPROFEN 200 MG
600 TABLET ORAL EVERY 6 HOURS
Refills: 0 | Status: COMPLETED | OUTPATIENT
Start: 2020-06-10 | End: 2021-05-09

## 2020-06-10 RX ORDER — IBUPROFEN 200 MG
1 TABLET ORAL
Qty: 28 | Refills: 0
Start: 2020-06-10 | End: 2020-06-16

## 2020-06-10 RX ADMIN — SODIUM CHLORIDE 125 MILLILITER(S): 9 INJECTION, SOLUTION INTRAVENOUS at 11:55

## 2020-06-10 RX ADMIN — Medication 2 MILLIUNIT(S)/MIN: at 08:30

## 2020-06-10 RX ADMIN — Medication 30 MILLILITER(S): at 10:21

## 2020-06-10 RX ADMIN — Medication 30 MILLIGRAM(S): at 21:55

## 2020-06-10 RX ADMIN — Medication 1000 MILLIUNIT(S)/MIN: at 22:11

## 2020-06-10 RX ADMIN — SODIUM CHLORIDE 125 MILLILITER(S): 9 INJECTION, SOLUTION INTRAVENOUS at 10:20

## 2020-06-10 RX ADMIN — SODIUM CHLORIDE 125 MILLILITER(S): 9 INJECTION, SOLUTION INTRAVENOUS at 07:00

## 2020-06-10 RX ADMIN — SODIUM CHLORIDE 3 MILLILITER(S): 9 INJECTION INTRAMUSCULAR; INTRAVENOUS; SUBCUTANEOUS at 23:08

## 2020-06-10 NOTE — CHART NOTE - NSCHARTNOTEFT_GEN_A_CORE
Patient resting comfortably with epidural in place. She reports some rectal pressure with contractions    Vital Signs Last 24 Hrs  T(C): 36.4 (10 Faustino 2020 13:48), Max: 37.0 (10 Faustino 2020 09:00)  T(F): 97.52 (10 Faustino 2020 13:48), Max: 98.6 (10 Faustino 2020 09:00)  HR: 113 (10 Faustino 2020 16:12) (72 - 118)  BP: 113/71 (10 Faustino 2020 16:00) (110/68 - 142/91)  RR: 15 (10 Faustino 2020 13:48) (15 - 18)  SpO2: 100% (10 Faustino 2020 16:12) (97% - 100%)    SVE: 9/100/+1  FHT: baseline 125, moderate variability, + accels, - decels  [place]: ctx q2-3min    - FHT cat 1  - Continue pitocin  - Continue to monitor

## 2020-06-10 NOTE — DISCHARGE NOTE OB - HOSPITAL COURSE
PPD#1 s/p  of a viable female infant. Patient transferred to post partum unit, uncomplicated hospital course. At the time of discharge patient was tolerating regular diet PO, ambulating, voiding, and demonstrating bowel function. Pain well controlled with pain medications PRN.

## 2020-06-10 NOTE — DISCHARGE NOTE OB - CARE PROVIDER_API CALL
Dyan Diaz)  OBSGYN  [facility]  [address] [place] [address]  Phone: [phone]  Fax: [phone]  Follow Up Time:

## 2020-06-10 NOTE — DISCHARGE NOTE OB - PATIENT PORTAL LINK FT
You can access the FollowMyHealth Patient Portal offered by [facility] by registering at the following website: [email or web address]. By joining [employer]’s FollowMyHealth portal, you will also be able to view your health information using other applications (apps) compatible with our system.

## 2020-06-10 NOTE — CHART NOTE - NSCHARTNOTEFT_GEN_A_CORE
Patient resting comfortably with epidural in place    Vital Signs Last 24 Hrs  T(C): 37.0 (10 Faustino 2020 11:02), Max: 37.0 (10 Faustino 2020 09:00)  T(F): 98.6 (10 Faustino 2020 11:02), Max: 98.6 (10 Faustino 2020 09:00)  HR: 77 (10 Faustino 2020 13:22) (72 - 118)  BP: 114/67 (10 Faustino 2020 13:00) (111/63 - 142/91)  RR: 18 (10 Faustino 2020 11:02) (16 - 18)  SpO2: 98% (10 Faustino 2020 13:07) (98% - 100%)    SVE: 7-8/80/0  FHT: baseline 125, moderate variability, + accels, + early decels  [place]: ctx q2-4min    - FHT cat 1  - Pit at 8  - Continue current management  - Continue to monitor

## 2020-06-10 NOTE — OB PROVIDER DELIVERY SUMMARY - NSPROVIDERDELIVERYNOTE_OBGYN_ALL_OB_FT
25yo  presenting for IOL for FGR.  Patient felt rectal pressure and was found to be fully dilated, +2 station. She pushed effectively for 8 minutes, and delivered a viable female infant at 19:47. Vertex delivered without difficulty, nuchal cord x2 reduced, shoulders then delivered without difficulty. Placenta delivered spontaneously and intact at 19;58. Pitocin started. Excellent hemostasis was achieved. Uterus, cervix, perineum and vagina were inspected and a 2nd degree laceration was noted and repaired with chromic suture. Apgars 9,9. .

## 2020-06-10 NOTE — DISCHARGE NOTE OB - NS AS DC FU INST LIST INST
Pt again put on call light and obsessing over urge to urinate. Pt reports he has a problem if he waits too long and has difficulty with emptying. Pt again up to bathroom and sitting down this time to try and go more. Wife to stay with pt.    no

## 2020-06-10 NOTE — OB RN DELIVERY SUMMARY - NS_SEPSISRSKCALC_OBGYN_ALL_OB_FT
EOS calculated successfully. EOS Risk Factor: 0.5/1000 live births ([facility] national incidence); GA=38w3d; Temp=98.78; ROM=12.05; GBS='Negative'; Antibiotics='No antibiotics or any antibiotics < 2 hrs prior to birth'

## 2020-06-11 ENCOUNTER — APPOINTMENT (OUTPATIENT)
Dept: ANTEPARTUM | Facility: CLINIC | Age: 25
End: 2020-06-11

## 2020-06-11 RX ORDER — SENNA PLUS 8.6 MG/1
2 TABLET ORAL AT BEDTIME
Refills: 0 | Status: DISCONTINUED | OUTPATIENT
Start: 2020-06-11 | End: 2020-06-12

## 2020-06-11 RX ORDER — IBUPROFEN 200 MG
600 TABLET ORAL EVERY 6 HOURS
Refills: 0 | Status: DISCONTINUED | OUTPATIENT
Start: 2020-06-11 | End: 2020-06-12

## 2020-06-11 RX ADMIN — Medication 975 MILLIGRAM(S): at 08:39

## 2020-06-11 RX ADMIN — Medication 5 MILLIGRAM(S): at 18:52

## 2020-06-11 RX ADMIN — Medication 975 MILLIGRAM(S): at 14:45

## 2020-06-11 RX ADMIN — Medication 975 MILLIGRAM(S): at 21:33

## 2020-06-11 RX ADMIN — Medication 975 MILLIGRAM(S): at 02:28

## 2020-06-11 RX ADMIN — Medication 600 MILLIGRAM(S): at 18:41

## 2020-06-11 RX ADMIN — Medication 1 TABLET(S): at 13:57

## 2020-06-11 RX ADMIN — MAGNESIUM HYDROXIDE 30 MILLILITER(S): 400 TABLET, CHEWABLE ORAL at 08:44

## 2020-06-11 RX ADMIN — Medication 600 MILLIGRAM(S): at 13:57

## 2020-06-11 RX ADMIN — SENNA PLUS 2 TABLET(S): 8.6 TABLET ORAL at 22:42

## 2020-06-11 NOTE — PROGRESS NOTE ADULT - SUBJECTIVE AND OBJECTIVE BOX
TARAN LENNON is a 23yo  now PPD#1 s/p spontaneous vaginal delivery at 38 3/7 weeks gestation following IOL 2/2 fetal growth restricition    S:    The patient has no complaints.  Pain controlled with current medications.   She is ambulating without difficulty and tolerating PO   + flatus/-BM/+ voiding   She is breastfeeding  She desires to go home today    O:    T(C): 36.9 (20 @ 03:58), Max: 37.1 (06-10-20 @ 15:00)  HR: 90 (20 @ 03:58) (71 - 143)  BP: 107/70 (20 @ 03:58) (106/83 - 142/91)  RR: 20 (20 @ 03:58) (15 - 20)  SpO2: 99% (20 @ 03:58) (97% - 100%)    Gen: NAD, AOx3  CV: RRR  Pulm: CTAB  Breast: nontender, non-engorged   Abdomen:  soft, non-tender, non-distended, +bowel sounds.  Uterus:  Fundus firm below umbilicus  VE:  +lochia  Ext:  Non-tender.                          11.6   7.44  )-----------( 133      ( 10 Faustino 2020 07:13 )             35.4 TARAN LENNON is a 23yo  now PPD#1 s/p spontaneous vaginal delivery at 38 3/7 weeks gestation following IOL 2/2 fetal growth restricition    S:    The patient has no complaints.  Pain controlled with current medications.   She is ambulating without difficulty and tolerating PO   + flatus/-BM/+ voiding   She is breastfeeding    O:    T(C): 36.9 (20 @ 03:58), Max: 37.1 (06-10-20 @ 15:00)  HR: 90 (20 @ 03:58) (71 - 143)  BP: 107/70 (20 @ 03:58) (106/83 - 142/91)  RR: 20 (20 @ 03:58) (15 - 20)  SpO2: 99% (20 @ 03:58) (97% - 100%)    Gen: NAD, AOx3  CV: RRR  Pulm: CTAB  Breast: nontender, non-engorged   Abdomen:  soft, non-tender, non-distended, +bowel sounds.  Uterus:  Fundus firm below umbilicus  VE:  +lochia  Ext:  Non-tender.                          11.6   7.44  )-----------( 133      ( 10 Faustino 2020 07:13 )             35.4

## 2020-06-11 NOTE — PROGRESS NOTE ADULT - ASSESSMENT
A/P:  Patient is a 23yo  now PPD#1 s/p spontaneous vaginal delivery at 38 3/7 weeks gestation following IOL 2/2 fetal growth restriction  -Stable  -Voiding, tolerating PO, bowel function nml   -Advance care as tolerated   -Continue routine postpartum care and education.  -Healthy female infant  -Stable for discharge home pending pediatrics clearance and OB attending approval  -She will get the MMR prior to discharge because she is rubeola non-immune

## 2020-06-11 NOTE — PROGRESS NOTE ADULT - ATTENDING COMMENTS
Patient seen and examined, no complaints  VS reviewed  Abdomen soft fundus firm/NT  Lower ext: no calf tenderness bilaterally  PPD#1 s/p  - doing well  patient desires to stay as infant will be 24 hours this evening

## 2020-06-12 ENCOUNTER — TRANSCRIPTION ENCOUNTER (OUTPATIENT)
Age: 25
End: 2020-06-12

## 2020-06-12 VITALS
SYSTOLIC BLOOD PRESSURE: 108 MMHG | RESPIRATION RATE: 20 BRPM | HEART RATE: 82 BPM | DIASTOLIC BLOOD PRESSURE: 70 MMHG | TEMPERATURE: 98 F

## 2020-06-12 PROCEDURE — 86901 BLOOD TYPING SEROLOGIC RH(D): CPT

## 2020-06-12 PROCEDURE — 88307 TISSUE EXAM BY PATHOLOGIST: CPT

## 2020-06-12 PROCEDURE — G0463: CPT

## 2020-06-12 PROCEDURE — 59025 FETAL NON-STRESS TEST: CPT

## 2020-06-12 PROCEDURE — 86850 RBC ANTIBODY SCREEN: CPT

## 2020-06-12 PROCEDURE — 85027 COMPLETE CBC AUTOMATED: CPT

## 2020-06-12 PROCEDURE — 86900 BLOOD TYPING SEROLOGIC ABO: CPT

## 2020-06-12 PROCEDURE — 86780 TREPONEMA PALLIDUM: CPT

## 2020-06-12 PROCEDURE — 36415 COLL VENOUS BLD VENIPUNCTURE: CPT

## 2020-06-12 PROCEDURE — 59050 FETAL MONITOR W/REPORT: CPT

## 2020-06-12 PROCEDURE — 90707 MMR VACCINE SC: CPT

## 2020-06-12 RX ADMIN — Medication 975 MILLIGRAM(S): at 04:03

## 2020-06-12 RX ADMIN — Medication 600 MILLIGRAM(S): at 01:06

## 2020-06-12 RX ADMIN — Medication 0.5 MILLILITER(S): at 09:58

## 2020-06-12 RX ADMIN — Medication 975 MILLIGRAM(S): at 09:09

## 2020-06-12 NOTE — PROGRESS NOTE ADULT - ASSESSMENT
A/P:  Patient is a 25yo  now PPD#1 s/p spontaneous vaginal delivery at 38 3/7 weeks gestation  -Stable  -Voiding, tolerating PO, bowel function nml   -Advance care as tolerated   -Continue routine postpartum care and education.  -Healthy female infant.  -Stable for discharge home today pending attending approval

## 2020-06-13 ENCOUNTER — TRANSCRIPTION ENCOUNTER (OUTPATIENT)
Age: 25
End: 2020-06-13

## 2020-06-15 ENCOUNTER — APPOINTMENT (OUTPATIENT)
Dept: ANTEPARTUM | Facility: CLINIC | Age: 25
End: 2020-06-15

## 2020-06-15 PROBLEM — F41.9 ANXIETY DISORDER, UNSPECIFIED: Chronic | Status: ACTIVE | Noted: 2020-06-10

## 2020-06-15 PROBLEM — F32.9 MAJOR DEPRESSIVE DISORDER, SINGLE EPISODE, UNSPECIFIED: Chronic | Status: ACTIVE | Noted: 2020-06-09

## 2020-06-15 PROBLEM — G43.909 MIGRAINE, UNSPECIFIED, NOT INTRACTABLE, WITHOUT STATUS MIGRAINOSUS: Chronic | Status: ACTIVE | Noted: 2020-06-09

## 2020-06-15 PROBLEM — B00.9 HERPESVIRAL INFECTION, UNSPECIFIED: Chronic | Status: ACTIVE | Noted: 2020-06-09

## 2020-06-16 LAB — SURGICAL PATHOLOGY STUDY: SIGNIFICANT CHANGE UP

## 2020-06-16 RX ORDER — ACETAMINOPHEN 325 MG/1
TABLET, FILM COATED ORAL
Refills: 0 | Status: DISCONTINUED | COMMUNITY
End: 2020-06-16

## 2020-06-18 ENCOUNTER — APPOINTMENT (OUTPATIENT)
Dept: ANTEPARTUM | Facility: CLINIC | Age: 25
End: 2020-06-18

## 2020-06-26 ENCOUNTER — APPOINTMENT (OUTPATIENT)
Dept: OBGYN | Facility: CLINIC | Age: 25
End: 2020-06-26
Payer: COMMERCIAL

## 2020-06-26 DIAGNOSIS — Z30.09 ENCOUNTER FOR OTHER GENERAL COUNSELING AND ADVICE ON CONTRACEPTION: ICD-10-CM

## 2020-06-26 PROCEDURE — 99214 OFFICE O/P EST MOD 30 MIN: CPT | Mod: 95

## 2020-06-26 NOTE — DISCUSSION/SUMMARY
[FreeTextEntry1] : Bri is interested in possibly taking oral contraceptive as birth control. \par She is scheduled for her 6 week PP visit on 07/21/2020 with MD Guillen .\par \par She was advised to call if any concerns especially baby blues/pp depression arise prior to next appointment. \par \par She is unsure if she would like to start contraception at this time but is thinking about starting a pill again. \par \par Patient concerns addressed, questions answered. Patient appreciative and verbalized understanding.

## 2020-06-26 NOTE — HISTORY OF PRESENT ILLNESS
[Home] : at home, [unfilled] , at the time of the visit. [Medical Office: ([facility])___] : at the medical office located in  [Verbal consent obtained from patient] : the patient, [unfilled]

## 2020-07-21 ENCOUNTER — APPOINTMENT (OUTPATIENT)
Dept: OBGYN | Facility: CLINIC | Age: 25
End: 2020-07-21
Payer: COMMERCIAL

## 2020-07-21 VITALS
DIASTOLIC BLOOD PRESSURE: 62 MMHG | SYSTOLIC BLOOD PRESSURE: 100 MMHG | HEIGHT: 67 IN | WEIGHT: 141 LBS | BODY MASS INDEX: 22.13 KG/M2

## 2020-07-21 DIAGNOSIS — F32.9 MAJOR DEPRESSIVE DISORDER, SINGLE EPISODE, UNSPECIFIED: ICD-10-CM

## 2020-07-21 DIAGNOSIS — O36.5990 MATERNAL CARE FOR OTHER KNOWN OR SUSPECTED POOR FETAL GROWTH, UNSPECIFIED TRIMESTER, NOT APPLICABLE OR UNSPECIFIED: ICD-10-CM

## 2020-07-21 DIAGNOSIS — Z30.011 ENCOUNTER FOR INITIAL PRESCRIPTION OF CONTRACEPTIVE PILLS: ICD-10-CM

## 2020-07-21 DIAGNOSIS — Z34.93 ENCOUNTER FOR SUPERVISION OF NORMAL PREGNANCY, UNSPECIFIED, THIRD TRIMESTER: ICD-10-CM

## 2020-07-21 DIAGNOSIS — Z13.32 ENCOUNTER FOR SCREENING FOR MATERNAL DEPRESSION: ICD-10-CM

## 2020-07-21 DIAGNOSIS — Z86.69 PERSONAL HISTORY OF OTHER DISEASES OF THE NERVOUS SYSTEM AND SENSE ORGANS: ICD-10-CM

## 2020-07-21 DIAGNOSIS — F41.9 ANXIETY DISORDER, UNSPECIFIED: ICD-10-CM

## 2020-07-21 DIAGNOSIS — R73.09 OTHER ABNORMAL GLUCOSE: ICD-10-CM

## 2020-07-21 PROCEDURE — 0503F POSTPARTUM CARE VISIT: CPT

## 2020-07-21 NOTE — PHYSICAL EXAM
[Awake] : awake [Alert] : alert [Mass] : no breast mass [Acute Distress] : no acute distress [Axillary LAD] : no axillary lymphadenopathy [Nipple Discharge] : no nipple discharge [Soft] : soft [Distended] : not distended [Tender] : non tender [Oriented x3] : oriented to person, place, and time [Depressed Mood] : not depressed [Flat Affect] : affect not flat [Normal] : cervix [Labia Minora] : labia minora [Labia Majora] : labia major [Tenderness] : nontender [Adnexa Tenderness] : were not tender [No Bleeding] : there was no active vaginal bleeding

## 2020-07-21 NOTE — REVIEW OF SYSTEMS
[Nl] : Integumentary [Fever] : no fever [Chest Pain] : no chest pain [Heart Rate Is Fast] : the heart rate was not fast [Chills] : no chills [SOB on Exertion] : no shortness of breath during exertion [Dyspnea] : no shortness of breath [Cough] : no cough [Breast Pain] : no breast pain [Headache] : no headache [Depression] : no depression [Anxiety] : no anxiety

## 2020-07-21 NOTE — CHIEF COMPLAINT
[Follow Up] : follow up GYN visit [FreeTextEntry1] : 6 WEEK PP VISIT- \par 6/10/20 - FEMALE 6LBS 9OZ "CARLIN"\par BREAST FEEDING

## 2020-07-21 NOTE — HISTORY OF PRESENT ILLNESS
[1 Year Ago] : 1 year ago [Good] : being in good health [Last Pap ___] : Last cervical pap smear was [unfilled] [Reproductive Age] : is of reproductive age [Pregnancy History] : pregnancy history: [Menarche Age: ____] : age at menarche was [unfilled] [Definite:  ___ (Date)] : the last menstrual period was [unfilled] [Menstrual Problems] : reports normal menses [Contraception] : does not use contraception [Localized Pain] : no localized pain [Sexually Active] : is not sexually active

## 2020-11-03 ENCOUNTER — APPOINTMENT (OUTPATIENT)
Dept: OBGYN | Facility: CLINIC | Age: 25
End: 2020-11-03
Payer: MEDICAID

## 2020-11-03 VITALS
SYSTOLIC BLOOD PRESSURE: 108 MMHG | HEIGHT: 67 IN | WEIGHT: 125 LBS | BODY MASS INDEX: 19.62 KG/M2 | DIASTOLIC BLOOD PRESSURE: 62 MMHG

## 2020-11-03 DIAGNOSIS — Z11.51 ENCOUNTER FOR SCREENING FOR HUMAN PAPILLOMAVIRUS (HPV): ICD-10-CM

## 2020-11-03 DIAGNOSIS — Z01.419 ENCOUNTER FOR GYNECOLOGICAL EXAMINATION (GENERAL) (ROUTINE) W/OUT ABNORMAL FINDINGS: ICD-10-CM

## 2020-11-03 DIAGNOSIS — Z12.4 ENCOUNTER FOR SCREENING FOR MALIGNANT NEOPLASM OF CERVIX: ICD-10-CM

## 2020-11-03 DIAGNOSIS — R87.612 LOW GRADE SQUAMOUS INTRAEPITHELIAL LESION ON CYTOLOGIC SMEAR OF CERVIX (LGSIL): ICD-10-CM

## 2020-11-03 DIAGNOSIS — Z30.41 ENCOUNTER FOR SURVEILLANCE OF CONTRACEPTIVE PILLS: ICD-10-CM

## 2020-11-03 PROCEDURE — 99395 PREV VISIT EST AGE 18-39: CPT

## 2020-11-03 PROCEDURE — 99072 ADDL SUPL MATRL&STAF TM PHE: CPT

## 2020-11-03 RX ORDER — PRENATAL VIT 49/IRON FUM/FOLIC 6.75-0.2MG
TABLET ORAL
Refills: 0 | Status: DISCONTINUED | COMMUNITY
End: 2020-11-03

## 2020-11-03 RX ORDER — SUMATRIPTAN SUCCINATE 100 MG/1
TABLET, FILM COATED ORAL
Refills: 0 | Status: ACTIVE | COMMUNITY

## 2020-11-03 RX ORDER — NORETHINDRONE 0.35 MG/1
0.35 TABLET ORAL
Qty: 3 | Refills: 3 | Status: ACTIVE | COMMUNITY
Start: 2020-07-21 | End: 1900-01-01

## 2020-11-03 RX ORDER — IBUPROFEN 600 MG/1
600 TABLET, FILM COATED ORAL EVERY 6 HOURS
Refills: 0 | Status: DISCONTINUED | COMMUNITY
Start: 2020-06-16 | End: 2020-11-03

## 2020-11-03 RX ORDER — FLUOXETINE HCL 10 MG
TABLET ORAL
Refills: 0 | Status: ACTIVE | COMMUNITY

## 2020-11-03 NOTE — PHYSICAL EXAM
[Appropriately responsive] : appropriately responsive [Alert] : alert [No Acute Distress] : no acute distress [No Lymphadenopathy] : no lymphadenopathy [No Murmurs] : no murmurs [Soft] : soft [Non-tender] : non-tender [Non-distended] : non-distended [No HSM] : No HSM [No Lesions] : no lesions [No Mass] : no mass [Oriented x3] : oriented x3 [FreeTextEntry3] : thyroid mobile, no masses [Examination Of The Breasts] : a normal appearance [No Masses] : no breast masses were palpable [Labia Majora] : normal [Labia Minora] : normal [Normal] : normal [Uterine Adnexae] : normal

## 2020-11-03 NOTE — DISCUSSION/SUMMARY
[FreeTextEntry1] : 24 y/o\par gyn annual\par -pap w/ HPV; prior pap abnormal\par -lifestyle changes discussed\par -discuss contraception options; and decreased effectiveness of POP\par -patient defers estrogen use while breastfeeding\par -refill POP\par \par rto 1 yr gyn annual or prn

## 2020-11-03 NOTE — HISTORY OF PRESENT ILLNESS
[Patient reported PAP Smear was abnormal] : Patient reported PAP Smear was abnormal [N] : Patient reports normal menses [Oral Contraceptive] : uses oral contraception pills [Monogamous (Male Partner)] : is monogamous with a male partner [Y] : Positive pregnancy history [Irregular Menstrual Interval] : irregular menstrual interval [Menarche Age: ____] : age at menarche was [unfilled] [Currently Active] : currently active [Men] : men [Vaginal] : vaginal [No] : No [Patient refuses STI testing] : Patient refuses STI testing [PapSmeardate] : 11/21/19 LSIL [TextBox_31] : L [GonorrheaDate] : 11/21/19 [ChlamydiaDate] : 11/21/19 [LMPDate] : 09/24/2020 [PGHxTotal] : 2 [[facility] CenterxFulerm] : 1 [[facility] General Hospitaliving] : 1 [PGHxABSpont] : 1 [FreeTextEntry1] : 09/24/2020

## 2020-11-06 LAB — HPV HIGH+LOW RISK DNA PNL CVX: NOT DETECTED

## 2020-11-08 LAB — CYTOLOGY CVX/VAG DOC THIN PREP: NORMAL

## 2020-12-23 PROBLEM — Z01.419 ENCOUNTER FOR ANNUAL ROUTINE GYNECOLOGICAL EXAMINATION: Status: RESOLVED | Noted: 2020-11-03 | Resolved: 2020-12-23

## 2025-05-21 NOTE — PROGRESS NOTE ADULT - SUBJECTIVE AND OBJECTIVE BOX
TARAN LENNON is a 25yo  now PPD#1 s/p spontaneous vaginal delivery at 38 3/7 weeks gestation    S:    The patient has no complaints.  Pain controlled with current medications.   She is ambulating without difficulty and tolerating PO   + flatus/+BM/+ voiding   She is breastfeeding  She desires to go home today    O:    T(C): 36.3 (20 @ 04:20), Max: 36.7 (20 @ 16:00)  HR: 93 (20 @ 04:20) (69 - 93)  BP: 125/85 (20 @ 04:20) (112/76 - 129/86)  RR: 20 (20 @ 04:20) (20 - 20)  SpO2: 99% (20 @ 04:20) (97% - 99%)    Gen: NAD, AOx3  CV: RRR  Pulm: CTAB  Breast: nontender, non-engorged   Abdomen:  soft, non-tender, non-distended, +bowel sounds.  Uterus:  Fundus firm below umbilicus  VE:  +lochia  Ext:  Non-tender.                          11.6   7.44  )-----------( 133      ( 10 Faustino 2020 07:13 )             35.4 Home